# Patient Record
Sex: FEMALE | Race: WHITE | NOT HISPANIC OR LATINO | URBAN - METROPOLITAN AREA
[De-identification: names, ages, dates, MRNs, and addresses within clinical notes are randomized per-mention and may not be internally consistent; named-entity substitution may affect disease eponyms.]

---

## 2017-04-11 ENCOUNTER — IMPORTED ENCOUNTER (OUTPATIENT)
Dept: URBAN - METROPOLITAN AREA CLINIC 50 | Facility: CLINIC | Age: 72
End: 2017-04-11

## 2017-04-18 ENCOUNTER — IMPORTED ENCOUNTER (OUTPATIENT)
Dept: URBAN - METROPOLITAN AREA CLINIC 50 | Facility: CLINIC | Age: 72
End: 2017-04-18

## 2018-04-17 ENCOUNTER — IMPORTED ENCOUNTER (OUTPATIENT)
Dept: URBAN - METROPOLITAN AREA CLINIC 50 | Facility: CLINIC | Age: 73
End: 2018-04-17

## 2019-04-16 ENCOUNTER — IMPORTED ENCOUNTER (OUTPATIENT)
Dept: URBAN - METROPOLITAN AREA CLINIC 50 | Facility: CLINIC | Age: 74
End: 2019-04-16

## 2019-11-19 ENCOUNTER — IMPORTED ENCOUNTER (OUTPATIENT)
Dept: URBAN - METROPOLITAN AREA CLINIC 50 | Facility: CLINIC | Age: 74
End: 2019-11-19

## 2019-11-21 ENCOUNTER — IMPORTED ENCOUNTER (OUTPATIENT)
Dept: URBAN - METROPOLITAN AREA CLINIC 50 | Facility: CLINIC | Age: 74
End: 2019-11-21

## 2020-01-13 ENCOUNTER — IMPORTED ENCOUNTER (OUTPATIENT)
Dept: URBAN - METROPOLITAN AREA CLINIC 50 | Facility: CLINIC | Age: 75
End: 2020-01-13

## 2020-01-15 ENCOUNTER — IMPORTED ENCOUNTER (OUTPATIENT)
Dept: URBAN - METROPOLITAN AREA CLINIC 50 | Facility: CLINIC | Age: 75
End: 2020-01-15

## 2020-02-07 ENCOUNTER — IMPORTED ENCOUNTER (OUTPATIENT)
Dept: URBAN - METROPOLITAN AREA CLINIC 50 | Facility: CLINIC | Age: 75
End: 2020-02-07

## 2020-02-13 ENCOUNTER — IMPORTED ENCOUNTER (OUTPATIENT)
Dept: URBAN - METROPOLITAN AREA CLINIC 50 | Facility: CLINIC | Age: 75
End: 2020-02-13

## 2020-02-13 NOTE — PATIENT DISCUSSION
"""S/P IOL OD: Tecnis ZCB00 20.0 (Target: [place]) +Omidria. Continue post operative instructions and drops per schedule.  """

## 2020-02-21 ENCOUNTER — IMPORTED ENCOUNTER (OUTPATIENT)
Dept: URBAN - METROPOLITAN AREA CLINIC 50 | Facility: CLINIC | Age: 75
End: 2020-02-21

## 2020-02-21 NOTE — PATIENT DISCUSSION
"""S/P IOL OD: Tecnis ZCB00 20.0 (Target: [place]) +Omidria.  Continue post operative instructions ""

## 2020-02-27 ENCOUNTER — IMPORTED ENCOUNTER (OUTPATIENT)
Dept: URBAN - METROPOLITAN AREA CLINIC 50 | Facility: CLINIC | Age: 75
End: 2020-02-27

## 2020-02-27 NOTE — PATIENT DISCUSSION
"""S/P IOL OS: Tecnis ZCB00 21.0 (Target: [place]) +Omidria. Continue post operative instructions and drops per schedule.  """

## 2020-03-11 ENCOUNTER — IMPORTED ENCOUNTER (OUTPATIENT)
Dept: URBAN - METROPOLITAN AREA CLINIC 50 | Facility: CLINIC | Age: 75
End: 2020-03-11

## 2020-03-11 NOTE — PATIENT DISCUSSION
"""S/P IOL OS: Tecnis ZCB00 21.0 (Target: [place]) +Omidria. Continue post operative instructions and drops per schedule.  """

## 2020-04-21 ENCOUNTER — IMPORTED ENCOUNTER (OUTPATIENT)
Dept: URBAN - METROPOLITAN AREA CLINIC 50 | Facility: CLINIC | Age: 75
End: 2020-04-21

## 2020-04-21 NOTE — PATIENT DISCUSSION
"""S/P IOL OU: OD: Tecnis ZCB00 20.0 (Target: [place])Omidria. OS: Tecnis ZCB00 21.0 (Target: [place]). "

## 2020-04-21 NOTE — PATIENT DISCUSSION
"""Discussed dry eye diagnosis with patient.  Educated patient on proper lid hygiene and stressed importance of lid massages and the use of warm compresses and artificial tears."" ""Start Zaditor both eyes twice a day

## 2020-10-07 ENCOUNTER — IMPORTED ENCOUNTER (OUTPATIENT)
Dept: URBAN - METROPOLITAN AREA CLINIC 50 | Facility: CLINIC | Age: 75
End: 2020-10-07

## 2021-04-17 ASSESSMENT — VISUAL ACUITY
OS_BAT: 20/60
OD_BAT: 20/60
OS_CC: 20/30-1
OD_CC: 20/40
OS_BAT: >20/400
OS_OTHER: >20/400.
OS_CC: J2@ 18 IN
OS_CC: J2@ 16 IN
OD_BAT: 20/60+2
OD_BAT: 20/50
OS_BAT: >20/400
OD_CC: J2@ 18 IN
OD_OTHER: >20/400. >20/400.
OD_OTHER: 20/50. 20/60.
OD_BAT: >20/400
OD_OTHER: 20/60. 20/100.
OS_OTHER: 20/100. >20/400.
OD_BAT: >20/400
OS_OTHER: 20/50. 20/60.
OD_CC: 20/30+
OS_OTHER: >20/400. >20/400.
OD_SC: 20/25-1
OD_PH: 20/25-1
OS_CC: 20/40
OD_OTHER: >20/400.
OD_CC: 20/40+
OS_CC: 20/25+2
OS_CC: J1+@ 16 IN
OS_PH: 20/20-2
OS_OTHER: >20/400. >20/400.
OS_OTHER: 20/60. 20/200.
OS_CC: J2@ 18 IN
OS_CC: 20/40-
OD_SC: 20/40+2
OD_CC: J2@ 16 IN
OS_CC: J2@ 16 IN
OD_CC: J1+@ 16 IN
OS_CC: 20/25
OS_PH: 20/30-1
OD_CC: 20/25
OS_CC: J2LAP READER
OS_SC: 20/25+2
OS_BAT: >20/400
OD_PH: 20/30
OS_BAT: 20/100
OS_CC: 20/30+1
OD_CC: J2@ 16 IN
OD_SC: 20/30-1
OD_CC: J2LAP READER
OS_SC: 20/30-1
OS_BAT: 20/50
OD_CC: J2@ 18 IN
OD_SC: 20/20-3
OD_CC: 20/50-2
OS_SC: 20/50-1
OD_SC: 20/25-2

## 2021-04-17 ASSESSMENT — TONOMETRY
OS_IOP_MMHG: 19
OS_IOP_MMHG: 16
OS_IOP_MMHG: 19
OD_IOP_MMHG: 17
OD_IOP_MMHG: 21
OD_IOP_MMHG: 16
OD_IOP_MMHG: 16
OD_IOP_MMHG: 14
OS_IOP_MMHG: 16
OD_IOP_MMHG: 16
OS_IOP_MMHG: 16
OS_IOP_MMHG: 14
OD_IOP_MMHG: 16
OS_IOP_MMHG: 17
OS_IOP_MMHG: 16
OD_IOP_MMHG: 16
OD_IOP_MMHG: 16

## 2021-09-30 ENCOUNTER — PREPPED CHART (OUTPATIENT)
Dept: URBAN - METROPOLITAN AREA CLINIC 53 | Facility: CLINIC | Age: 76
End: 2021-09-30

## 2021-10-06 ENCOUNTER — ROUTINE EXAM (OUTPATIENT)
Dept: URBAN - METROPOLITAN AREA CLINIC 53 | Facility: CLINIC | Age: 76
End: 2021-10-06

## 2021-10-06 DIAGNOSIS — H10.33: ICD-10-CM

## 2021-10-06 DIAGNOSIS — H35.373: ICD-10-CM

## 2021-10-06 DIAGNOSIS — H43.813: ICD-10-CM

## 2021-10-06 DIAGNOSIS — H35.362: ICD-10-CM

## 2021-10-06 DIAGNOSIS — H26.493: ICD-10-CM

## 2021-10-06 DIAGNOSIS — Z01.01: ICD-10-CM

## 2021-10-06 DIAGNOSIS — H01.00B: ICD-10-CM

## 2021-10-06 DIAGNOSIS — H01.00A: ICD-10-CM

## 2021-10-06 PROCEDURE — 92015 DETERMINE REFRACTIVE STATE: CPT

## 2021-10-06 PROCEDURE — 92014 COMPRE OPH EXAM EST PT 1/>: CPT

## 2021-10-06 PROCEDURE — 92134 CPTRZ OPH DX IMG PST SGM RTA: CPT

## 2021-10-06 RX ORDER — ERYTHROMYCIN 5 MG/G
OINTMENT OPHTHALMIC ONCE A DAY
Start: 2021-10-06

## 2021-10-06 RX ORDER — OLOPATADINE HYDROCHLORIDE 2 MG/ML
1 SOLUTION OPHTHALMIC ONCE A DAY
Start: 2021-10-06

## 2021-10-06 ASSESSMENT — TONOMETRY
OS_IOP_MMHG: 17
OD_IOP_MMHG: 17

## 2021-10-06 ASSESSMENT — VISUAL ACUITY
OS_CC: 20/30
OD_CC: 20/25

## 2023-07-11 ENCOUNTER — COMPREHENSIVE EXAM (OUTPATIENT)
Dept: URBAN - METROPOLITAN AREA CLINIC 52 | Facility: CLINIC | Age: 78
End: 2023-07-11

## 2023-07-11 DIAGNOSIS — H26.493: ICD-10-CM

## 2023-07-11 DIAGNOSIS — H43.813: ICD-10-CM

## 2023-07-11 DIAGNOSIS — H35.373: ICD-10-CM

## 2023-07-11 DIAGNOSIS — H35.362: ICD-10-CM

## 2023-07-11 DIAGNOSIS — Z01.01: ICD-10-CM

## 2023-07-11 PROCEDURE — 92014 COMPRE OPH EXAM EST PT 1/>: CPT

## 2023-07-11 PROCEDURE — 92134 CPTRZ OPH DX IMG PST SGM RTA: CPT

## 2023-07-11 PROCEDURE — 92015 DETERMINE REFRACTIVE STATE: CPT

## 2023-07-11 ASSESSMENT — VISUAL ACUITY
OD_SC: 20/40
OS_PH: 20/25-1
OU_SC: 20/25
OD_GLARE: 20/50
OU_CC: 20/30-1
OD_PH: 20/25
OS_SC: 20/25-1
OS_CC: 20/50-1
OS_GLARE: 20/25
OD_GLARE: 20/30
OD_CC: 20/40-1
OS_GLARE: 20/40
OU_CC: J1+

## 2023-07-11 ASSESSMENT — KERATOMETRY
OS_K1POWER_DIOPTERS: 42.00
OS_AXISANGLE_DEGREES: 02
OD_AXISANGLE_DEGREES: 18
OD_K2POWER_DIOPTERS: 43.00
OS_K2POWER_DIOPTERS: 43.00
OS_AXISANGLE2_DEGREES: 92
OD_AXISANGLE2_DEGREES: 108
OD_K1POWER_DIOPTERS: 42.25

## 2023-07-11 ASSESSMENT — TONOMETRY
OS_IOP_MMHG: 17
OD_IOP_MMHG: 17